# Patient Record
Sex: FEMALE
[De-identification: names, ages, dates, MRNs, and addresses within clinical notes are randomized per-mention and may not be internally consistent; named-entity substitution may affect disease eponyms.]

---

## 2020-06-09 ENCOUNTER — APPOINTMENT (OUTPATIENT)
Dept: PODIATRY | Facility: CLINIC | Age: 65
End: 2020-06-09
Payer: COMMERCIAL

## 2020-06-09 VITALS
WEIGHT: 235 LBS | SYSTOLIC BLOOD PRESSURE: 126 MMHG | DIASTOLIC BLOOD PRESSURE: 80 MMHG | BODY MASS INDEX: 41.64 KG/M2 | HEIGHT: 63 IN

## 2020-06-09 DIAGNOSIS — L85.1 ACQUIRED KERATOSIS [KERATODERMA] PALMARIS ET PLANTARIS: ICD-10-CM

## 2020-06-09 PROBLEM — Z00.00 ENCOUNTER FOR PREVENTIVE HEALTH EXAMINATION: Status: ACTIVE | Noted: 2020-06-09

## 2020-06-09 PROCEDURE — 73630 X-RAY EXAM OF FOOT: CPT | Mod: LT

## 2020-06-09 PROCEDURE — 99203 OFFICE O/P NEW LOW 30 MIN: CPT | Mod: 25

## 2020-06-09 PROCEDURE — 11721 DEBRIDE NAIL 6 OR MORE: CPT

## 2020-06-09 PROCEDURE — 11057 PARNG/CUTG B9 HYPRKR LES >4: CPT | Mod: 59

## 2020-06-09 RX ORDER — PANTOPRAZOLE SODIUM 40 MG/10ML
40 INJECTION, POWDER, FOR SOLUTION INTRAVENOUS
Refills: 0 | Status: ACTIVE | COMMUNITY

## 2020-06-09 RX ORDER — PRAVASTATIN SODIUM 10 MG/1
10 TABLET ORAL
Refills: 0 | Status: ACTIVE | COMMUNITY

## 2020-06-09 RX ORDER — AMMONIUM LACTATE 12 %
12 CREAM (GRAM) TOPICAL TWICE DAILY
Qty: 1 | Refills: 3 | Status: ACTIVE | COMMUNITY
Start: 2020-06-09 | End: 1900-01-01

## 2020-06-09 RX ORDER — LEVOTHYROXINE SODIUM 150 UG/1
150 TABLET ORAL
Refills: 0 | Status: ACTIVE | COMMUNITY

## 2020-06-09 NOTE — PHYSICAL EXAM
[General Appearance - Alert] : alert [General Appearance - In No Acute Distress] : in no acute distress [Full Pulse] : the pedal pulses are present [Edema] : there was no peripheral edema [Deep Tendon Reflexes (DTR)] : deep tendon reflexes were 2+ and symmetric [Sensation] : the sensory exam was normal to light touch and pinprick [No Focal Deficits] : no focal deficits [Oriented To Time, Place, And Person] : oriented to person, place, and time [Impaired Insight] : insight and judgment were intact [Affect] : the affect was normal [FreeTextEntry1] : The patient has all contributing factors to onychomycosis including but not limited to thickness, subungual debris, discoloration and partial lysis and they are brittle when cut.   Painful hyperkeratotic lesions noted sub 5 bilat, diffuse 2-4, sub left styloid process.   visual exam of the area exhibits interruption of skin lines and capillary bleeding, upon debridemment, and classic characteristics seen in a verruca, cauliflower-like appearance, pain on side to side compression\par

## 2020-06-09 NOTE — HISTORY OF PRESENT ILLNESS
[FreeTextEntry1] : Location: plantar aspect left heel\par Duration: over 6 months\par Chronic: yes\par Past Tx: nothing\par Exacerbated by:walking, standing +PSD\par Verruca : unknown, she didn't know it was a wart\par The patient presents for follow up of chronic and painful corns, calluses and mycotic nail disease. Past professional  tx's in the presence of PAD have consisted of periodic debridements which have offered significant relief and controlling of symptoms\par \par \par

## 2020-06-09 NOTE — PROCEDURE
[FreeTextEntry1] : X-rays were taken in the office multiple views of the left foot\par xrays reveal an inferior calcaneal spur, otherwise negative study\par \par I had a lengthy and informative discussion with the patient today regarding plantar fasciitis. I explained to the patient that there are several etiologies as well contributing factors to this problem as well as what can aggravate it. It could include the presence or lack of of a heel spur, the type of foot type they have, the way they walk, it also could be related to the type of shoes they wear. I also made them understand that it could be a combination of all these problems together. I explained etiologies treatment options both conservative and surgical. I gave educational literature regarding this problem. Some of the treatment options as they range from conservative to aggressive include over-the-counter anti-inflammatories, prescription anti-inflammatories, custom shoes, custom orthotics, steroid injection, physical therapy, night splints, orthotripsy, and NSAIDS. There is also possible surgical intervention if all conservative measures failed to alleviate the problem. I did explain to the patient the type of shoe gear this should be wearing and gave him a copy of my plantar fascia stretching exercises with instructions to ice afterwards.\par \par A complete and thorough evaluation of the type of shoes they should be wearing and type of shoes for this time of year was discussed with patient.\par \par \par i have dispensed a pair of heel cushions to the patient and advised the patient that accommodative support as well as elevation of both heels to help reduce symptoms\par \par Since the patient is currently taking non-steroidal anti-inflammatory medication I had a complete and thorough discussion with the patient regarding risks and benefits of these types of medications. The most common side effects include but are not limited to gastrointestinal upset, blood in the stool, nausea, diarrhea, as well as tinnitus. I have advised the patient that if they should experience any of the side effects that she discontinue them at once and contacted primary care physician for immediate followup. I did advise the patient that these types of medications can be taken on an as needed basis and if they are not having pain he should not take them. I also advised that the patient should follow the explicit instructions on the labile and not to exceed the recommended dosage.\par The patient was advised formal physical therapy is critical at this point and that I recommend it in order to try and achieve best possible outcome to their problem. Rx has been supplied.\par \par Shaving of a benign lesion using a sterile scalpel the skin was debrided to capillary bleeding and Cantharone applied. A dry sterile bandage with discharge instructions was reviewed with the patient and a followup appointment given.. \par \par \par Using sterile instrumentation debridement of all nails manually and electrically to decrease thickness, pain and girth and make shoe gear more comfortable with "slant back" procedure of any bordering spicules causing pain\par \par Utilizing a scalpel and sterile aseptic technique sharp debridement of multiple hyperkeratotic lesions ( more than 4) was performed. Appropriate padding applied were necessary.\par

## 2020-06-25 ENCOUNTER — APPOINTMENT (OUTPATIENT)
Dept: PODIATRY | Facility: CLINIC | Age: 65
End: 2020-06-25
Payer: COMMERCIAL

## 2020-06-25 VITALS
WEIGHT: 235 LBS | BODY MASS INDEX: 41.64 KG/M2 | DIASTOLIC BLOOD PRESSURE: 70 MMHG | HEIGHT: 63 IN | SYSTOLIC BLOOD PRESSURE: 130 MMHG

## 2020-06-25 PROCEDURE — 17110 DESTRUCTION B9 LES UP TO 14: CPT

## 2020-06-25 PROCEDURE — 99213 OFFICE O/P EST LOW 20 MIN: CPT | Mod: 25

## 2020-06-25 NOTE — PHYSICAL EXAM
[General Appearance - Alert] : alert [Full Pulse] : the pedal pulses are present [General Appearance - In No Acute Distress] : in no acute distress [Edema] : there was no peripheral edema [FreeTextEntry1] : visual exam of the area exhibits interruption of skin lines and capillary bleeding, upon debridemment, and classic characteristics seen in a verruca, cauliflower-like appearance, pain on side to side compression\par

## 2020-06-25 NOTE — HISTORY OF PRESENT ILLNESS
[FreeTextEntry1] : Location: plantar aspect left heel\par Duration: over 6 months\par Chronic: yes\par Past Tx: nothing\par Exacerbated by:walking, standing +PSD\par Verruca : unknown, she didn't know it was a wart\par The patient presents for follow up of chronic and painful corns, calluses and mycotic nail disease. Past professional  tx's in the presence of PAD have consisted of periodic debridements which have offered significant relief and controlling of symptoms\par \par Patient never went to PT, patient never called pharmacy for zipsor until recently\par \par \par

## 2020-06-25 NOTE — PROCEDURE
[FreeTextEntry1] :  Cryotherapy as well as risks and benefits were reviewed with the patient and all questions asked and answered and I also reviewed hyfrecation of the lesion. Risks benefits and possible recurrence was stressed with the patient. After reviewing all types of procedures as well as the aftercare involved, the patient opted for Cryotherapy. \par   Procedures: \par        Utilizing the appropriate tip, and using an alternating freeze, law, freeze technique in a circular manner with the probe advancing and distracting from the lesion complete cryotherapy was performed. The patient tolerated the procedure well discharge instructions reviewed expectations also reviewed and the treatment completed successfully the patient left the office in stable and satisfactory condition. \par \par Since the patient is currently taking non-steroidal anti-inflammatory medication I had a complete and thorough discussion with the patient regarding risks and benefits of these types of medications. The most common side effects include but are not limited to gastrointestinal upset, blood in the stool, nausea, diarrhea, as well as tinnitus. I have advised the patient that if they should experience any of the side effects that she discontinue them at once and contacted primary care physician for immediate followup. I did advise the patient that these types of medications can be taken on an as needed basis and if they are not having pain he should not take them. I also advised that the patient should follow the explicit instructions on the labile and not to exceed the recommended dosage.\par \par During the evaluation and management I had a lengthy discussion with the patient regarding benefits of functional foot orthoses. I explained to the patient the etiology and treatment options and one of them included the offloading and balancing of the painful portion of the foot. I explained the importance of balancing in offloading the painful area as part of the overall treatment process to advance healing. I have asked the patient to consider this as part of the treatment\par The patient was advised formal physical therapy is critical at this point and that I recommend it in order to try and achieve best possible outcome to their problem and please start\par follow up appt 4 weeks\par

## 2020-07-10 ENCOUNTER — TRANSCRIPTION ENCOUNTER (OUTPATIENT)
Age: 65
End: 2020-07-10

## 2020-07-23 ENCOUNTER — APPOINTMENT (OUTPATIENT)
Dept: PODIATRY | Facility: CLINIC | Age: 65
End: 2020-07-23

## 2023-06-05 ENCOUNTER — APPOINTMENT (OUTPATIENT)
Dept: PODIATRY | Facility: CLINIC | Age: 68
End: 2023-06-05
Payer: COMMERCIAL

## 2023-06-05 VITALS — BODY MASS INDEX: 41.64 KG/M2 | WEIGHT: 235 LBS | HEIGHT: 63 IN

## 2023-06-05 DIAGNOSIS — Z78.9 OTHER SPECIFIED HEALTH STATUS: ICD-10-CM

## 2023-06-05 DIAGNOSIS — B07.0 PLANTAR WART: ICD-10-CM

## 2023-06-05 DIAGNOSIS — Z87.891 PERSONAL HISTORY OF NICOTINE DEPENDENCE: ICD-10-CM

## 2023-06-05 DIAGNOSIS — M79.671 PAIN IN RIGHT FOOT: ICD-10-CM

## 2023-06-05 DIAGNOSIS — Z80.1 FAMILY HISTORY OF MALIGNANT NEOPLASM OF TRACHEA, BRONCHUS AND LUNG: ICD-10-CM

## 2023-06-05 DIAGNOSIS — Z80.7 FAMILY HISTORY OF OTHER MALIGNANT NEOPLASMS OF LYMPHOID, HEMATOPOIETIC AND RELATED TISSUES: ICD-10-CM

## 2023-06-05 PROCEDURE — 99213 OFFICE O/P EST LOW 20 MIN: CPT | Mod: 25

## 2023-06-05 PROCEDURE — 73630 X-RAY EXAM OF FOOT: CPT | Mod: RT

## 2023-06-05 PROCEDURE — 17110 DESTRUCTION B9 LES UP TO 14: CPT

## 2023-06-05 RX ORDER — DICLOFENAC POTASSIUM 25 MG/1
25 CAPSULE, LIQUID FILLED ORAL
Qty: 90 | Refills: 1 | Status: DISCONTINUED | COMMUNITY
Start: 2020-06-18 | End: 2023-06-05

## 2023-06-05 NOTE — REVIEW OF SYSTEMS
[As Noted in HPI] : as noted in HPI [Skin Lesions] : skin lesion [Skin Wound] : no skin wound [Negative] : Constitutional

## 2023-06-05 NOTE — HISTORY OF PRESENT ILLNESS
[FreeTextEntry1] : The above-named patient presents to the office with multiple complaints.  Patient has a complaint of a lesion to the lateral aspect of her left foot, pain to the right pinky toe and occasional pain to the plantar aspect of the right heel.   Patient has not sought any type of professional treatment only self treatment, over-the-counter products, and pedicures for the digital problem.  She does have a history of heel pain for which she states it is being controlled with a simple use of heel cushions the appropriate shoe gear and over-the-counter medication if needed

## 2023-06-05 NOTE — PROCEDURE
[FreeTextEntry1] : X-rays were taken in the office multiple views right foot simulating angle and base of gait\par xrays reveal an inferior calcaneal spur, otherwise negative study\par Shaving of a benign lesion using a sterile scalpel the skin was debrided to capillary bleeding and Cantharone applied. A dry sterile bandage with discharge instructions was reviewed with the patient and a followup appointment given.. \par \par A complete and thorough evaluation of the type of shoes they should be wearing and type of shoes for this time of year was discussed with patient.\par \par I had a lengthy discussion with the patient regarding the way they should be cutting a nail in an effort to help prevent recurrence of this problem. I also revised self treatment should not be attempted and should there be any redness or pain on the side of the nail rather than treating it themselves they should call the office to make a follow up.\par

## 2023-06-05 NOTE — PHYSICAL EXAM
[General Appearance - Alert] : alert [General Appearance - In No Acute Distress] : in no acute distress [FreeTextEntry3] : Vascular exam reveals palpable pedal pulses, the foot is warm to touch, there was good capillary fill time, the skin is normal in appearance there is no evidence of vascular disease or compromise at this time [de-identified] : no bruising, no ecchymosis, no breaks in the skin, no evidence of plantar fibromas noted., no history of injury or trauma, mild reproducible pain upon palpation of the plantar aspect of the calcaneus without medial lateral squeeze pain [FreeTextEntry1] : visual exam of the area exhibits interruption of skin lines and capillary bleeding, upon debridemment, and classic characteristics seen in a verruca, cauliflower-like appearance, pain on side to side compression\par \par

## 2024-04-01 ENCOUNTER — APPOINTMENT (OUTPATIENT)
Dept: PODIATRY | Facility: CLINIC | Age: 69
End: 2024-04-01
Payer: COMMERCIAL

## 2024-04-01 VITALS
OXYGEN SATURATION: 95 % | HEIGHT: 63 IN | WEIGHT: 255 LBS | BODY MASS INDEX: 45.18 KG/M2 | SYSTOLIC BLOOD PRESSURE: 143 MMHG | DIASTOLIC BLOOD PRESSURE: 82 MMHG | HEART RATE: 82 BPM

## 2024-04-01 DIAGNOSIS — B35.1 TINEA UNGUIUM: ICD-10-CM

## 2024-04-01 DIAGNOSIS — M72.2 PLANTAR FASCIAL FIBROMATOSIS: ICD-10-CM

## 2024-04-01 DIAGNOSIS — L85.3 XEROSIS CUTIS: ICD-10-CM

## 2024-04-01 PROCEDURE — 99213 OFFICE O/P EST LOW 20 MIN: CPT

## 2024-04-01 NOTE — PHYSICAL EXAM
[General Appearance - In No Acute Distress] : in no acute distress [General Appearance - Alert] : alert [FreeTextEntry3] : Vascular exam reveals palpable pedal pulses, the foot is warm to touch, there was good capillary fill time, the skin is normal in appearance there is no evidence of vascular disease or compromise at this time [de-identified] : Examination shows less pain upon palpation of the plantar aspect of the heel, little pain on the medial band of the plantar fascia, the patient denies as much pain at the end of the day, the pain they currently experience is more in the form of post static dyskinesia. The patient states prior treatments have significantly improved the condition. No additional signs or symptoms regarding heel pain noted. [FreeTextEntry1] : The patient has all contributing factors to onychomycosis including but not limited to thickness, subungual debris, discoloration and partial lysis and they are brittle when cut. There is cracking and peeling noted to the plantar aspect of both feet specifically the proximal areas around the heel medially laterally and plantarly

## 2024-04-01 NOTE — HISTORY OF PRESENT ILLNESS
[FreeTextEntry1] : Location- both GTN, f/u PF left Duration-> 6 months Past tx- was on oral AF many yeras ago

## 2024-04-01 NOTE — PROCEDURE
[FreeTextEntry1] : A lengthy and inform a discussion with the patient regarding different types of treatments for the mycotic nail disease. I stressed clinical versus mycologic cure rates and anticipated success rates regarding topical medications oral medications as well as laser therapy. I discussed in great length with the patient the success rates and success rates anticipated. There were no guarantees given regarding any of the treatment reviewed. I did explain to the patient that there are risks to oral antifungal therapy however those risks can be greatly decreased with obtaining blood tests prior and possibly during the treatment if indicated. The patient will weigh their options and contact the office I have had a lengthy discussion with the patient regarding overall skincare. The importance of the type of socks, the type of shoes, and the type of overall foot hygiene is important to help control and prevent eruptions especially over extreme weather changes. This included but was not limited to hydration and lubrication, dove soap, triple rinse clothing and linens. I also explained the importance of thorough drying of both feet especially the web spaces. Given the extreme temperatures back in a car I also reviewed the type of shoes that would help reduce the chances of cracking of the skin especially leading to fissuring of the heels. Overall skincare precautions were reviewed education literature dispensed in the patient's questions asked and answered appropriately. I lengthy discussion with the patient today regarding hygiene and foot health. My discussion to focus mainly on prevention of pathology and remaining proactive. My discussion included but was not limited to overall foot health, foot hygiene, the risks of walking barefoot especially in public places and the need to be conscious of the cleanliness of facilities where not only in a walk barefoot but other people walk barefoot as well. All questions were asked and answered appropriately and educational literature was dispensed

## 2024-04-01 NOTE — REVIEW OF SYSTEMS
[Leg Claudication] : no intermittent leg claudication [Lower Ext Edema] : no lower extremity edema [As Noted in HPI] : as noted in HPI [Negative] : Musculoskeletal

## 2024-04-02 LAB — CALCOFLUOR WHITE SPEC: NORMAL

## 2024-04-05 LAB — CORE LAB BIOPSY: NORMAL

## 2025-03-27 ENCOUNTER — NEW PATIENT (OUTPATIENT)
Age: 70
End: 2025-03-27

## 2025-03-27 DIAGNOSIS — H52.203: ICD-10-CM

## 2025-03-27 DIAGNOSIS — H25.813: ICD-10-CM

## 2025-03-27 DIAGNOSIS — H52.13: ICD-10-CM

## 2025-03-27 DIAGNOSIS — H43.813: ICD-10-CM

## 2025-03-27 DIAGNOSIS — H02.831: ICD-10-CM

## 2025-03-27 DIAGNOSIS — H52.4: ICD-10-CM

## 2025-03-27 DIAGNOSIS — H02.834: ICD-10-CM

## 2025-03-27 PROCEDURE — 92015 DETERMINE REFRACTIVE STATE: CPT

## 2025-03-27 PROCEDURE — 92004 COMPRE OPH EXAM NEW PT 1/>: CPT

## 2025-04-03 ENCOUNTER — CONSULTATION/EVALUATION (OUTPATIENT)
Age: 70
End: 2025-04-03

## 2025-04-03 DIAGNOSIS — H02.834: ICD-10-CM

## 2025-04-03 DIAGNOSIS — H02.883: ICD-10-CM

## 2025-04-03 DIAGNOSIS — H25.813: ICD-10-CM

## 2025-04-03 DIAGNOSIS — H02.831: ICD-10-CM

## 2025-04-03 DIAGNOSIS — H43.813: ICD-10-CM

## 2025-04-03 DIAGNOSIS — H02.886: ICD-10-CM

## 2025-04-03 PROCEDURE — 92134 CPTRZ OPH DX IMG PST SGM RTA: CPT

## 2025-04-03 PROCEDURE — 92025-3 CORNEAL TOPO, REFUSED: Mod: NC

## 2025-04-03 PROCEDURE — 99214 OFFICE O/P EST MOD 30 MIN: CPT

## 2025-04-03 PROCEDURE — 92136 OPHTHALMIC BIOMETRY: CPT

## 2025-06-04 ENCOUNTER — ADDENDUM (OUTPATIENT)
Age: 70
End: 2025-06-04

## 2025-06-18 ENCOUNTER — PRE-OP/H&P (OUTPATIENT)
Age: 70
End: 2025-06-18

## 2025-06-18 ENCOUNTER — SURGERY/PROCEDURE (OUTPATIENT)
Age: 70
End: 2025-06-18

## 2025-06-18 DIAGNOSIS — H43.813: ICD-10-CM

## 2025-06-18 DIAGNOSIS — H02.834: ICD-10-CM

## 2025-06-18 DIAGNOSIS — H25.813: ICD-10-CM

## 2025-06-18 DIAGNOSIS — H25.811: ICD-10-CM

## 2025-06-18 DIAGNOSIS — H02.886: ICD-10-CM

## 2025-06-18 DIAGNOSIS — H02.883: ICD-10-CM

## 2025-06-18 DIAGNOSIS — H02.831: ICD-10-CM

## 2025-06-18 PROCEDURE — 99211HP PRE-OP

## 2025-06-18 PROCEDURE — 66984 XCAPSL CTRC RMVL W/O ECP: CPT

## 2025-06-19 ENCOUNTER — PRE-OP/H&P (OUTPATIENT)
Age: 70
End: 2025-06-19

## 2025-06-19 ENCOUNTER — SURGERY/PROCEDURE (OUTPATIENT)
Age: 70
End: 2025-06-19

## 2025-06-19 ENCOUNTER — TECH ONLY (OUTPATIENT)
Age: 70
End: 2025-06-19

## 2025-06-19 DIAGNOSIS — H02.886: ICD-10-CM

## 2025-06-19 DIAGNOSIS — H25.812: ICD-10-CM

## 2025-06-19 DIAGNOSIS — H02.883: ICD-10-CM

## 2025-06-19 DIAGNOSIS — H43.813: ICD-10-CM

## 2025-06-19 DIAGNOSIS — Z96.1: ICD-10-CM

## 2025-06-19 PROCEDURE — 99211HP PRE-OP

## 2025-06-19 PROCEDURE — 66984 XCAPSL CTRC RMVL W/O ECP: CPT | Mod: 79,LT

## 2025-06-19 PROCEDURE — 99211T TECH SERVICE

## 2025-06-20 ENCOUNTER — POST-OP (OUTPATIENT)
Age: 70
End: 2025-06-20

## 2025-06-20 DIAGNOSIS — Z96.1: ICD-10-CM

## 2025-06-20 PROCEDURE — 99024 POSTOP FOLLOW-UP VISIT: CPT

## 2025-07-11 ENCOUNTER — POST-OP (OUTPATIENT)
Age: 70
End: 2025-07-11

## 2025-07-11 DIAGNOSIS — Z96.1: ICD-10-CM

## 2025-07-11 PROCEDURE — 99024 POSTOP FOLLOW-UP VISIT: CPT
